# Patient Record
Sex: MALE | Employment: FULL TIME | URBAN - METROPOLITAN AREA
[De-identification: names, ages, dates, MRNs, and addresses within clinical notes are randomized per-mention and may not be internally consistent; named-entity substitution may affect disease eponyms.]

---

## 2023-04-13 ENCOUNTER — OFFICE VISIT (OUTPATIENT)
Dept: PRIMARY CARE | Facility: CLINIC | Age: 23
End: 2023-04-13
Payer: COMMERCIAL

## 2023-04-13 VITALS
RESPIRATION RATE: 16 BRPM | SYSTOLIC BLOOD PRESSURE: 110 MMHG | HEART RATE: 60 BPM | DIASTOLIC BLOOD PRESSURE: 74 MMHG | WEIGHT: 176 LBS | BODY MASS INDEX: 27.62 KG/M2 | HEIGHT: 67 IN | OXYGEN SATURATION: 97 % | TEMPERATURE: 98.5 F

## 2023-04-13 DIAGNOSIS — Z00.00 ROUTINE ADULT HEALTH MAINTENANCE: Primary | ICD-10-CM

## 2023-04-13 DIAGNOSIS — F41.9 ANXIOUS MOOD: ICD-10-CM

## 2023-04-13 PROBLEM — J30.2 SEASONAL ALLERGIC RHINITIS: Status: ACTIVE | Noted: 2023-04-13

## 2023-04-13 PROCEDURE — 1036F TOBACCO NON-USER: CPT

## 2023-04-13 PROCEDURE — 99385 PREV VISIT NEW AGE 18-39: CPT

## 2023-04-13 ASSESSMENT — ENCOUNTER SYMPTOMS
HEADACHES: 1
DYSPHORIC MOOD: 0
BLOOD IN STOOL: 0
CHILLS: 0
NAUSEA: 0
PALPITATIONS: 0
FATIGUE: 0
APPETITE CHANGE: 0
WHEEZING: 0
CHEST TIGHTNESS: 0
DIZZINESS: 0
VOMITING: 0
FEVER: 0
DIFFICULTY URINATING: 0
FLANK PAIN: 0
ARTHRALGIAS: 0
SHORTNESS OF BREATH: 0
SINUS PAIN: 0
MYALGIAS: 0
ABDOMINAL PAIN: 0
HEMATURIA: 0
SLEEP DISTURBANCE: 0
DIARRHEA: 0
NERVOUS/ANXIOUS: 1
RHINORRHEA: 0
CONSTIPATION: 0
LIGHT-HEADEDNESS: 0

## 2023-04-13 NOTE — PROGRESS NOTES
I saw and evaluated the patient. I personally obtained the key and critical portions of the history and physical exam or was physically present for key and critical portions performed by the resident/fellow. I reviewed the resident/fellow's documentation and discussed the patient with the resident/fellow. I agree with the resident/fellow's medical decision making as documented in the note.    Pearl Duran MD

## 2023-04-13 NOTE — ASSESSMENT & PLAN NOTE
Reports anxious mood at times and expressed.  Acknowledges that he should focus on healthy coping strategies which were reviewed during this visit.  Has never seen psychiatrist or taking medications (not interested in medications at this time).  Would like to pursue therapy in the future when it is more financially viable.    Reviewed medication/coping strategies for anxiety and provided online resources that patient can use.    We will follow-up as needed.

## 2023-04-13 NOTE — PROGRESS NOTES
"Subjective   Mau Vieyra is a 22 y.o. male who is here for a routine exam. Originally from [place] and moved to [place] for work.  He has not seen a primary care doctor in approximately 5 years.  Medical history includes childhood asthma and allergies for which he previously used inhalers and allergy medications.  No longer taking allergy medications or needing inhalers.  Reports occasional \"shortness of breath when stressed\"\", but thinks that this is more related to anxiety.  Reports left knee injury during high school that flares up when he overexerts himself-has soreness and instability for several days after (self-reported diagnosis was tendinitis).    Comprehensive Medical/Surgical/Social/Family History  Past Medical History:   Diagnosis Date    Childhood asthma     Used inhaler as child, has not needed medication in years.     Past Surgical History:   Procedure Laterality Date    HERNIA REPAIR      As an infant    TESTICLE SURGERY      Detorsion of torsed testical     Social History     Social History Narrative    Not on file         Allergies and Medications  Patient has no known allergies.  No current outpatient medications on file prior to visit.     No current facility-administered medications on file prior to visit.       Review of Systems   Constitutional:  Negative for appetite change, chills, fatigue and fever.   HENT:  Negative for ear discharge, ear pain, hearing loss, postnasal drip, rhinorrhea and sinus pain.    Eyes:  Negative for visual disturbance.   Respiratory:  Negative for chest tightness, shortness of breath and wheezing.    Cardiovascular:  Negative for chest pain, palpitations and leg swelling.   Gastrointestinal:  Negative for abdominal pain, blood in stool, constipation, diarrhea, nausea and vomiting.   Genitourinary:  Negative for difficulty urinating, flank pain, genital sores and hematuria.   Musculoskeletal:  Negative for arthralgias and myalgias.   Skin:  Negative for rash. " "  Allergic/Immunologic: Positive for environmental allergies. Negative for food allergies.   Neurological:  Positive for headaches (frequent headaches as of late, has not needed medication). Negative for dizziness and light-headedness.   Psychiatric/Behavioral:  Negative for dysphoric mood, self-injury, sleep disturbance and suicidal ideas. The patient is nervous/anxious (no psychiatrist or medications. has looked into anxiety).    All other systems reviewed and are negative.      Objective   /74 (BP Location: Left arm, Patient Position: Sitting, BP Cuff Size: Adult)   Pulse 60   Temp 36.9 °C (98.5 °F)   Resp 16   Ht 1.695 m (5' 6.75\")   Wt 79.8 kg (176 lb)   SpO2 97%   BMI 27.77 kg/m²     Physical Exam  Vitals reviewed.   Constitutional:       General: He is not in acute distress.     Appearance: Normal appearance. He is normal weight. He is not toxic-appearing.   HENT:      Head: Normocephalic and atraumatic.      Nose: Nose normal. No congestion or rhinorrhea.   Eyes:      General: No scleral icterus.     Extraocular Movements: Extraocular movements intact.      Conjunctiva/sclera: Conjunctivae normal.      Pupils: Pupils are equal, round, and reactive to light.   Cardiovascular:      Rate and Rhythm: Normal rate and regular rhythm.      Heart sounds: No murmur heard.     No friction rub. No gallop.   Pulmonary:      Effort: Pulmonary effort is normal. No respiratory distress.      Breath sounds: Normal breath sounds. No wheezing, rhonchi or rales.   Abdominal:      General: Abdomen is flat. There is no distension.      Palpations: Abdomen is soft.      Tenderness: There is no abdominal tenderness. There is no guarding.   Musculoskeletal:         General: Normal range of motion.      Cervical back: Normal range of motion and neck supple.      Right lower leg: No edema.      Left lower leg: No edema.   Lymphadenopathy:      Cervical: No cervical adenopathy.   Skin:     General: Skin is warm and dry. "   Neurological:      General: No focal deficit present.      Mental Status: He is alert and oriented to person, place, and time.   Psychiatric:         Mood and Affect: Mood normal.         Behavior: Behavior normal.         Assessment/Plan   Problem List Items Addressed This Visit       Anxious mood     Reports anxious mood at times and expressed.  Acknowledges that he should focus on healthy coping strategies which were reviewed during this visit.  Has never seen psychiatrist or taking medications (not interested in medications at this time).  Would like to pursue therapy in the future when it is more financially viable.    Reviewed medication/coping strategies for anxiety and provided online resources that patient can use.    We will follow-up as needed.          Other Visit Diagnoses       Routine adult health maintenance    -  Primary    Call with results of lab work and determine need for follow-up based on results.    Relevant Orders    Lipid panel    CBC    Comprehensive Metabolic Panel            Reviewed Social Determinants of health with patient, discussed healthy lifestyle including 150 minutes of physical activity per week  Ordered/Reviewed baseline labwork -CBC, CMP, Lipid Panel  Immunizations Up-to-Date